# Patient Record
Sex: MALE | NOT HISPANIC OR LATINO | URBAN - METROPOLITAN AREA
[De-identification: names, ages, dates, MRNs, and addresses within clinical notes are randomized per-mention and may not be internally consistent; named-entity substitution may affect disease eponyms.]

---

## 2023-01-16 ENCOUNTER — TRANSFERRED RECORDS (OUTPATIENT)
Dept: HEALTH INFORMATION MANAGEMENT | Facility: CLINIC | Age: 23
End: 2023-01-16
Payer: COMMERCIAL

## 2023-01-23 ENCOUNTER — TRANSCRIBE ORDERS (OUTPATIENT)
Dept: OTHER | Age: 23
End: 2023-01-23

## 2023-01-23 DIAGNOSIS — H17.11 CENTRAL CORNEAL OPACITY, RIGHT EYE: Primary | ICD-10-CM

## 2023-03-13 ENCOUNTER — OFFICE VISIT (OUTPATIENT)
Dept: OPHTHALMOLOGY | Facility: CLINIC | Age: 23
End: 2023-03-13
Attending: OPHTHALMOLOGY
Payer: COMMERCIAL

## 2023-03-13 DIAGNOSIS — H17.11 CENTRAL CORNEAL OPACITY, RIGHT EYE: ICD-10-CM

## 2023-03-13 DIAGNOSIS — H11.132 CONJUNCTIVAL PIGMENTATIONS OF LEFT EYE: ICD-10-CM

## 2023-03-13 DIAGNOSIS — H53.011 DEPRIVATION AMBLYOPIA OF RIGHT EYE: ICD-10-CM

## 2023-03-13 DIAGNOSIS — H17.9 CORNEAL SCAR AND OPACITY: ICD-10-CM

## 2023-03-13 DIAGNOSIS — H17.9 CORNEAL SCAR AND OPACITY: Primary | ICD-10-CM

## 2023-03-13 PROCEDURE — 99205 OFFICE O/P NEW HI 60 MIN: CPT | Mod: GC | Performed by: OPHTHALMOLOGY

## 2023-03-13 PROCEDURE — G0463 HOSPITAL OUTPT CLINIC VISIT: HCPCS | Performed by: OPHTHALMOLOGY

## 2023-03-13 PROCEDURE — G0463 HOSPITAL OUTPT CLINIC VISIT: HCPCS | Mod: 25

## 2023-03-13 PROCEDURE — G0463 HOSPITAL OUTPT CLINIC VISIT: HCPCS | Mod: 25 | Performed by: OPHTHALMOLOGY

## 2023-03-13 PROCEDURE — 92285 EXTERNAL OCULAR PHOTOGRAPHY: CPT | Performed by: OPHTHALMOLOGY

## 2023-03-13 RX ORDER — FLUOCINONIDE TOPICAL SOLUTION USP, 0.05% 0.5 MG/ML
SOLUTION TOPICAL 2 TIMES DAILY
COMMUNITY
Start: 2023-03-07

## 2023-03-13 ASSESSMENT — TONOMETRY
OS_IOP_MMHG: 9
OD_IOP_MMHG: 12
IOP_METHOD: ICARE

## 2023-03-13 ASSESSMENT — CONF VISUAL FIELD
OD_SUPERIOR_TEMPORAL_RESTRICTION: 0
OS_NORMAL: 1
OS_INFERIOR_TEMPORAL_RESTRICTION: 0
OS_INFERIOR_NASAL_RESTRICTION: 0
OD_INFERIOR_NASAL_RESTRICTION: 0
OD_NORMAL: 1
OD_SUPERIOR_NASAL_RESTRICTION: 0
OD_INFERIOR_TEMPORAL_RESTRICTION: 0
OS_SUPERIOR_NASAL_RESTRICTION: 0
OS_SUPERIOR_TEMPORAL_RESTRICTION: 0

## 2023-03-13 ASSESSMENT — SLIT LAMP EXAM - LIDS
COMMENTS: NORMAL
COMMENTS: NORMAL

## 2023-03-13 ASSESSMENT — VISUAL ACUITY
OD_SC: CF @3FT
OS_SC: 20/20
METHOD: SNELLEN - LINEAR
OS_SC+: -3

## 2023-03-13 ASSESSMENT — EXTERNAL EXAM - RIGHT EYE: OD_EXAM: NORMAL

## 2023-03-13 ASSESSMENT — EXTERNAL EXAM - LEFT EYE: OS_EXAM: NORMAL

## 2023-03-13 NOTE — NURSING NOTE
Chief Complaints and History of Present Illnesses   Patient presents with     Corneal Evaluation       New patient corneal evaluation of both eyes.         Referral     Per Grady Cuenca MD for Central corneal opacity, right eye.      Chief Complaint(s) and History of Present Illness(es)     Corneal Evaluation            Laterality: both eyes    Associated symptoms: floaters.  Negative for eye pain and flashes    Treatments tried: no treatments    Pain scale: 0/10    Comments:   New patient corneal evaluation of both eyes.              Referral            Comments: Per Grady Cuenca MD for Central corneal opacity, right eye.          Comments    Eye meds: none  Patient says NUVIA was January 2023.  Left eye floaters.    DONNY Munoz 3/13/2023 1:24 PM

## 2023-03-13 NOTE — PROGRESS NOTES
Chief complaint     Chief Complaints and History of Present Illnesses   Patient presents with     Corneal Evaluation       New patient corneal evaluation of both eyes.         Referral     Per Grady Cuenca MD for Central corneal opacity, right eye.       Referring Provider: Grady Cuenca MD - notes, images, labs from referring provider reviewed in detail.    HPI    Jaleel Dougherty 23 year old male presents as referral from Dr. Grady Cuenca. Patient was seen at [facility] on 1/18/23. 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 Per outside documentation, patient was sent over for consideration of corneal tattooing in the right eye, as he is unhappy with the appearance of the right eye. Patient reports that his vision has been stable and poor in the right eye without recent change. No pain or discomfort. He is unhappy with the scar's appearance. Also notes that there has been a dark spot on the conjunctiva of his left eye for about 5-6 years    Past ocular history   Prior eye surgery/laser/Trauma: Corneal perforation/traumatic cataract right eye as a child  CTL wearer:no  Glasses : no  Family Hx of eye disease:no    PMH   No past medical history on file.    PSH   No past surgical history on file.    Meds     No current outpatient medications on file.     No current facility-administered medications for this visit.       Labs   -    Imaging   -    Drops Currently Taking   N/A    Assessment/Plan   # Corneal Scarring, Right eye  #Amblyopia, right eye  - Full thickness of the cornea and dense amblyopia from childhood injury  - Would advise against PKP given dense amblyopia and limited vision with or without clear cornea  - Discussed options: corneal tattoo vs. Corneal patch graft - risk of iris injury with corneal patch graft   - recommend corneal tattoo - and discussed that we  can consider patch graft if unacceptable cosmesis   - discussed risk of infection, persistent epithelial defect, uveitis, keratitis 2/2 corneal tattooing    #Traumatic cataract, right eye  - From prior trauma as a child  - Vision limited by amblyopia  - Monitor    #Conjunctival Pigmented lesion, left eye  - Limbal patch of pigmentation, lesion appears consistent with nevus  - Has been stable in size for ~5 years per patient report, does not recall when it first appeared  - slit lamp photos OU - to monitor  - Recommended monitoring at this time; discussed returning sooner with increase in size, symptoms    Follow up:  Post-op, w/ V,T, at next visit, call if problems sooner to clinic.    Noel Voss MD  Fellow, Cornea, External Disease and Refractive Surgery  Department of Ophthalmology  [facility]    Attending Physician Attestation:  Complete documentation of historical and exam elements from today's encounter can be found in the full encounter summary report (not reduplicated in this progress note).  I personally obtained the chief complaint(s) and history of present illness.  I confirmed and edited as necessary the review of systems, past medical/surgical history, family history, social history, and examination findings as documented by others; and I examined the patient myself.  I personally reviewed the relevant tests, images, and reports as documented above.  I formulated and edited as necessary the assessment and plan and discussed the findings and management plan with the patient and family. I personally reviewed the ophthalmic test(s) associated with this encounter, agree with the interpretation(s) as documented by the resident/fellow, and have edited the corresponding report(s) as necessary. - Mook Shearer MD    I personally spent great than 45minutes spent on the date of the encounter doing chart review, history and exam, discussion with the patient, documentation, and further activities  as noted above. We discussed the complexity of their diagnosis, the need for further information, close monitoring, continued management, and the unknown prognosis for the patient at this time.    Mook Shearer MD

## 2023-03-13 NOTE — LETTER
3/13/2023       RE: Jaleel Dougherty  [address] [facility] [address]     Dear Colleague,    Thank you for referring your patient, Jaleel Dougherty, to the [facility] [place] at [facility]. Please see a copy of my visit note below.    Chief complaint     Chief Complaints and History of Present Illnesses   Patient presents with     Corneal Evaluation       New patient corneal evaluation of both eyes.         Referral     Per Grady Cuenca MD for Central corneal opacity, right eye.       Referring Provider: Grady Cuenca MD - notes, images, labs from referring provider reviewed in detail.    HPI    Jaleel Dougherty 23 year old male presents as referral from Dr. Grady Cuenca. Patient was seen at [facility] on 1/18/23. 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 Per outside documentation, patient was sent over for consideration of corneal tattooing in the right eye, as he is unhappy with the appearance of the right eye. Patient reports that his vision has been stable and poor in the right eye without recent change. No pain or discomfort. He is unhappy with the scar's appearance. Also notes that there has been a dark spot on the conjunctiva of his left eye for about 5-6 years    Past ocular history   Prior eye surgery/laser/Trauma: Corneal perforation/traumatic cataract right eye as a child  CTL wearer:no  Glasses : no  Family Hx of eye disease:no    PMH   No past medical history on file.    PSH   No past surgical history on file.    Meds     No current outpatient medications on file.     No current facility-administered medications for this visit.       Labs   -    Imaging   -    Drops Currently Taking   N/A    Assessment/Plan   # Corneal Scarring, Right eye  #Amblyopia,  right eye  - Full thickness of the cornea and dense amblyopia from childhood injury  - Would advise against PKP given dense amblyopia and limited vision with or without clear cornea  - Discussed options: corneal tattoo vs. Corneal patch graft - risk of iris injury with corneal patch graft   - recommend corneal tattoo - and discussed that we can consider patch graft if unacceptable cosmesis     #Traumatic cataract, right eye  - From prior trauma as a child  - Vision limited by amblyopia  - Monitor    #Conjunctival Pigmented lesion, left eye  - Limbal patch of pigmentation, lesion appears consistent with nevus  - Has been stable in size for ~5 years per patient report, does not recall when it first appeared  - slit lamp photos OU - to monitor  - Recommended monitoring at this time; discussed returning sooner with increase in size, symptoms    Follow up:  Post-op, w/ V,T, at next visit, call if problems sooner to clinic.    Noel Voss MD  Fellow, Cornea, External Disease and Refractive Surgery  Department of Ophthalmology  [facility]      Again, thank you for allowing me to participate in the care of your patient.      Sincerely,    Mook Shearer MD

## 2023-03-14 ENCOUNTER — TELEPHONE (OUTPATIENT)
Dept: OPHTHALMOLOGY | Facility: CLINIC | Age: 23
End: 2023-03-14
Payer: COMMERCIAL

## 2023-03-24 ENCOUNTER — TELEPHONE (OUTPATIENT)
Dept: OPHTHALMOLOGY | Facility: CLINIC | Age: 23
End: 2023-03-24
Payer: COMMERCIAL

## 2023-03-24 PROBLEM — H17.11 CENTRAL CORNEAL OPACITY, RIGHT EYE: Status: ACTIVE | Noted: 2023-03-24

## 2023-03-24 PROBLEM — H17.9 CORNEAL SCAR AND OPACITY: Status: ACTIVE | Noted: 2023-03-24

## 2023-03-24 NOTE — TELEPHONE ENCOUNTER
Patient is scheduled for surgery with Dr. Mook Shearer     Spoke with: Jaleel     Date of Surgery: 05/23/23     Location: [facility]     H&P will be completed at: [facility]     Post Op scheduled on 05/24, 05/31, and 06/28     Surgery packet was mailed 03/24     Additional comments: Advised RN will call 1 - 3 business days prior with arrival time and instructions. Informed patient they will need an adult  and responsible adult to stay with for 24 hours following surgery

## 2023-03-27 NOTE — TELEPHONE ENCOUNTER
FUTURE VISIT INFORMATION      SURGERY INFORMATION:    Date: 5/23/23    [facility]    Surgeon:  Mook Shearer MD    Anesthesia Type:  MAC with Retrobulbar    Procedure: KERATECTOMY, SUPERFICIAL, CORNEAL TATTOOING    Consult: ov 3/13    RECORDS REQUESTED FROM:       Primary Care Provider: Terrence

## 2023-05-15 RX ORDER — DICLOFENAC SODIUM 1 MG/ML
1 SOLUTION/ DROPS OPHTHALMIC
Status: CANCELLED | OUTPATIENT
Start: 2023-05-15

## 2023-05-15 RX ORDER — PROPARACAINE HYDROCHLORIDE 5 MG/ML
1 SOLUTION/ DROPS OPHTHALMIC ONCE
Status: CANCELLED | OUTPATIENT
Start: 2023-05-16 | End: 2023-05-16

## 2023-05-16 ENCOUNTER — PRE VISIT (OUTPATIENT)
Dept: SURGERY | Facility: CLINIC | Age: 23
End: 2023-05-16

## 2023-05-16 DIAGNOSIS — Z98.890 POSTOPERATIVE EYE STATE: Primary | ICD-10-CM

## 2023-05-16 RX ORDER — KETOROLAC TROMETHAMINE 5 MG/ML
1 SOLUTION OPHTHALMIC 4 TIMES DAILY
Qty: 5 ML | Refills: 0 | Status: SHIPPED | OUTPATIENT
Start: 2023-05-16

## 2023-05-16 RX ORDER — PREDNISOLONE ACETATE 10 MG/ML
1 SUSPENSION/ DROPS OPHTHALMIC 4 TIMES DAILY
Qty: 5 ML | Refills: 0 | Status: SHIPPED | OUTPATIENT
Start: 2023-05-16

## 2023-05-16 RX ORDER — MOXIFLOXACIN 5 MG/ML
1 SOLUTION/ DROPS OPHTHALMIC 4 TIMES DAILY
Qty: 3 ML | Refills: 0 | Status: SHIPPED | OUTPATIENT
Start: 2023-05-16

## 2023-05-17 ENCOUNTER — PRE VISIT (OUTPATIENT)
Dept: SURGERY | Facility: CLINIC | Age: 23
End: 2023-05-17

## 2023-05-21 ENCOUNTER — HEALTH MAINTENANCE LETTER (OUTPATIENT)
Age: 23
End: 2023-05-21

## 2023-07-07 ENCOUNTER — TELEPHONE (OUTPATIENT)
Dept: OPHTHALMOLOGY | Facility: CLINIC | Age: 23
End: 2023-07-07

## 2023-07-07 NOTE — TELEPHONE ENCOUNTER
Patient is scheduled for surgery with Dr. Mook Shearer     Spoke with: Pranay     Date of Surgery: 09/19     Location: [facility]:  [address] [place] [id number]     H&P will be completed at: [facility] 08/29     Post Op scheduled on 09/20, 09/25, 10/18     Surgery packet was mailed 07/07     Additional comments: Advised RN will call 1 - 3 business days prior with arrival time and instructions. Informed patient they will need an adult  and responsible adult to stay with for 24 hours following surgery    ~~~~~~~~~~~~~~~~~~~~~~~~~~~~~~    I called and spoke with patient and answered his questions. I recommended taking 1 week off from work to recover.    Mook Shearer MD   of Ophthalmology

## 2023-07-12 NOTE — TELEPHONE ENCOUNTER
FUTURE VISIT INFORMATION        SURGERY INFORMATION:    Date: 9/19/23    [facility]    Surgeon:  Mook Shearer MD    Anesthesia Type:  MAC with Retrobulbar    Procedure: KERATECTOMY, SUPERFICIAL, CORNEAL TATTOOING    Consult: ov 3/13     RECORDS REQUESTED FROM:         Primary Care Provider: Terrence

## 2023-08-29 ENCOUNTER — PRE VISIT (OUTPATIENT)
Dept: SURGERY | Facility: CLINIC | Age: 23
End: 2023-08-29

## 2023-08-31 ENCOUNTER — TELEPHONE (OUTPATIENT)
Dept: OPHTHALMOLOGY | Facility: CLINIC | Age: 23
End: 2023-08-31

## 2023-09-07 ENCOUNTER — TELEPHONE (OUTPATIENT)
Dept: OPHTHALMOLOGY | Facility: CLINIC | Age: 23
End: 2023-09-07

## 2023-09-14 ENCOUNTER — TELEPHONE (OUTPATIENT)
Dept: OPHTHALMOLOGY | Facility: CLINIC | Age: 23
End: 2023-09-14

## 2023-09-14 NOTE — TELEPHONE ENCOUNTER
Spoke briefly with patient, he wishes to reschedule but needs to speak with insurance on dates and will return call    Anna C. Schoenecker on 9/14/2023 at 10:49 AM

## 2023-09-19 ENCOUNTER — HOSPITAL ENCOUNTER (OUTPATIENT)
Facility: AMBULATORY SURGERY CENTER | Age: 23
Discharge: HOME | End: 2023-09-19
Attending: OPHTHALMOLOGY
Payer: COMMERCIAL

## 2023-09-19 DIAGNOSIS — H17.9 CORNEAL SCAR AND OPACITY: ICD-10-CM

## 2023-09-19 DIAGNOSIS — H17.11 CENTRAL CORNEAL OPACITY, RIGHT EYE: ICD-10-CM

## 2024-07-28 ENCOUNTER — HEALTH MAINTENANCE LETTER (OUTPATIENT)
Age: 24
End: 2024-07-28

## 2025-08-10 ENCOUNTER — HEALTH MAINTENANCE LETTER (OUTPATIENT)
Age: 25
End: 2025-08-10